# Patient Record
Sex: FEMALE | NOT HISPANIC OR LATINO | URBAN - METROPOLITAN AREA
[De-identification: names, ages, dates, MRNs, and addresses within clinical notes are randomized per-mention and may not be internally consistent; named-entity substitution may affect disease eponyms.]

---

## 2018-08-07 ENCOUNTER — OFFICE VISIT CONVERTED (OUTPATIENT)
Dept: PULMONOLOGY | Facility: CLINIC | Age: 53
End: 2018-08-07
Attending: INTERNAL MEDICINE

## 2018-08-21 ENCOUNTER — OFFICE VISIT CONVERTED (OUTPATIENT)
Dept: PULMONOLOGY | Facility: CLINIC | Age: 53
End: 2018-08-21
Attending: INTERNAL MEDICINE

## 2019-06-20 ENCOUNTER — OFFICE VISIT CONVERTED (OUTPATIENT)
Dept: PULMONOLOGY | Facility: CLINIC | Age: 54
End: 2019-06-20
Attending: PHYSICIAN ASSISTANT

## 2019-06-20 ENCOUNTER — HOSPITAL ENCOUNTER (OUTPATIENT)
Dept: GENERAL RADIOLOGY | Facility: HOSPITAL | Age: 54
Discharge: HOME | End: 2019-06-20
Attending: PHYSICIAN ASSISTANT

## 2019-07-31 ENCOUNTER — OFFICE VISIT CONVERTED (OUTPATIENT)
Dept: PULMONOLOGY | Facility: CLINIC | Age: 54
End: 2019-07-31
Attending: INTERNAL MEDICINE

## 2021-05-28 VITALS
HEIGHT: 68 IN | WEIGHT: 176.5 LBS | RESPIRATION RATE: 12 BRPM | TEMPERATURE: 97.9 F | HEART RATE: 71 BPM | DIASTOLIC BLOOD PRESSURE: 80 MMHG | SYSTOLIC BLOOD PRESSURE: 138 MMHG | BODY MASS INDEX: 26.75 KG/M2 | OXYGEN SATURATION: 97 %

## 2021-05-28 VITALS
WEIGHT: 171.06 LBS | DIASTOLIC BLOOD PRESSURE: 91 MMHG | BODY MASS INDEX: 26.41 KG/M2 | BODY MASS INDEX: 25.92 KG/M2 | SYSTOLIC BLOOD PRESSURE: 149 MMHG | HEIGHT: 68 IN | WEIGHT: 174.25 LBS | RESPIRATION RATE: 10 BRPM | OXYGEN SATURATION: 97 % | OXYGEN SATURATION: 99 % | HEART RATE: 66 BPM | TEMPERATURE: 98.4 F | TEMPERATURE: 98.1 F | HEART RATE: 64 BPM | SYSTOLIC BLOOD PRESSURE: 159 MMHG | DIASTOLIC BLOOD PRESSURE: 84 MMHG | HEIGHT: 68 IN | RESPIRATION RATE: 12 BRPM

## 2021-05-28 VITALS
HEART RATE: 68 BPM | SYSTOLIC BLOOD PRESSURE: 154 MMHG | HEIGHT: 68 IN | DIASTOLIC BLOOD PRESSURE: 96 MMHG | RESPIRATION RATE: 14 BRPM | WEIGHT: 171.19 LBS | TEMPERATURE: 98.2 F | OXYGEN SATURATION: 100 % | BODY MASS INDEX: 25.94 KG/M2

## 2021-05-28 NOTE — PROGRESS NOTES
Patient: AVE HERNÁNDEZ     Acct: [id number]     Report: #[id number]  UNIT #: [id number]     : 1965    Encounter Date:2019  PRIMARY CARE: FORREST CACERES  ***Signed***  --------------------------------------------------------------------------------------------------------------------  Chief Complaint      Encounter Date      2019            Primary Care Provider      FORREST CACERES            Referring Provider      FORREST CACERES            Patient Complaint      Patient is complaining of      Patient here today for follow up, Solitary pulmonary nodule on lung CT            VITALS      Height 68 in / 172.72 cm      Weight 171 lbs 3 oz / 77.567289 kg      BSA 1.91 m2      BMI 26.0 kg/m2      Temperature 98.2 F / 36.78 C - Oral      Pulse 68      Respirations 14      Blood Pressure 154/96 Sitting, Right Arm      Pulse Oximetry 100%, room air            HPI      The patient is a very pleasant 53 year old white female who previously saw Dr. Cornelius and Dr. Lua in lung clinic. She was last seen by Dr. Cornelius about 1     year ago. She was referred to us for pulmonary nodules that were incidentally     noted on CT scan of the chest. She had a PET scan done and there was no      hypermetabolic activity of the largest nodule so follow up CT scan of the chest     was  ordered for 3 months later. The PET scan was done 2018 and CT scan     was ordered for 2018. Unfortunately the patient never had the testing     done, did not provide any explanation for that today. She was completely lost to    follow up until today. The patient denies any increased dyspnea, coughing or     wheezing, denies hemoptysis, fever or chills, unintended weight loss, enlarged     lymph nodes or night sweats.  She admits that she still smokes cigarettes     smoking 1-1.5 packs per day and says she is not interested in quitting right     now.             I reviewed her Review of Systems, medical, surgical  and family history and agree    with those as entered.      Copies To:   Soy Cornelius      Constitutional:  Denies: Fatigue, Fever, Weight gain, Weight loss, Chills,     Insomnia, Other      Respiratory/Breathing:  Denies: Shortness of air, Wheezing, Cough, Hemoptysis,     Pleuritic pain, Other      Endocrine:  Denies: Polydipsia, Polyuria, Heat/cold intolerance, Abnorml     menstrual pattern, Diabetes, Other      Eyes:  Denies: Blurred vision, Vision Changes, Other      Ears, nose, mouth, throat:  Denies: Mouth lesions, Thrush, Throat pain,     Hoarseness, Allergies/Hay Fever, Post Nasal Drip, Headaches, Recent Head Injury,    Nose Bleeding, Neck Stiffness, Thyroid Mass, Hearing Loss, Ear Fullness, Dry     Mouth, Nasal or Sinus Pain, Dry Lips, Nasal discharge, Nasal congestion, Other      Cardiovascular:  Denies: Palpitations, Syncope, Claudication, Chest Pain, Wake     up Gasping for air, Leg Swelling, Irregular Heart Rate, Cyanosis, Dyspnea on     Exertion, Other      Gastrointestinal:  Denies: Nausea, Constipation, Diarrhea, Abdominal pain,     Vomiting, Difficulty Swallowing, Reflux/Heartburn, Dysphagia, Jaundice,     Bloating, Melena, Bloody stools, Other      Genitourinary:  Denies: Urinary frequency, Incontinence, Hematuria, Urgency,     Nocturia, Dysuria, Testicular problems, Other      Musculoskeletal:  Denies: Joint Pain, Joint Stiffness, Joint Swelling, Myalgias,    Other      Hematologic/lymphatic:  DENIES: Lymphadenopathy, Bruising, Bleeding tendencies,     Other      Neurological:  Denies: Headache, Numbness, Weakness, Seizures, Other      Psychiatric:  Denies: Anxiety, Appropriate Effect, Depression, Other      Sleep:  No: Excessive daytime sleep, Morning Headache?, Snoring, Insomnia?, Stop    breathing at sleep?, Other      Integumentary:  Denies: Rash, Dry skin, Skin Warm to Touch, Other      Immunologic/Allergic:  Denies: Latex allergy, Seasonal allergies, Asthma,     Urticaria,  Eczema, Other      Immunization status:  No: Up to date            FAMILY/SOCIAL/MEDICAL HX      Surgical History:  Yes: Breast Surgery, Orthopedic Surgery (right wrist, left     leg), Other Surgeries (ablasion)      Stroke - Family Hx:  Mother, Grandparent      Heart - Family Hx:  Grandparent      Diabetes - Family Hx:  Father      Cancer/Type - Family Hx:  Brother      Is Father Still Living?:  Yes      Is Mother Still Living?:  No      Smoking status:  Current every day smoker (1 ppd x15 years, .5 ppd x 3 years)      Anticoagulation Therapy:  No      Antibiotic Prophylaxis:  No      Medical History:  Yes: Depression, Anxiety, High Blood Pressure, High     Cholesterol, Thyroid Problem; No: Sinus Trouble      Psychiatric History      depression and anxiety            PREVENTION      Hx Influenza Vaccination:  Yes      Date Influenza Vaccine Given:  Mar 1, 2018      Influenza Vaccine Declined:  No      2 or More Falls Past Year?:  No      Fall Past Year with Injury?:  No      Hx Pneumococcal Vaccination:  No      Encouraged to follow-up with:  PCP regarding preventative exams.      Chart initiated by      Dede Griffin CMA            ALLERGIES/MEDICATIONS      Allergies:        Coded Allergies:             CLINDAMYCIN (Verified  Allergy, Intermediate, Rash, 6/20/19)      Medications    Last Reconciled on 6/20/19 10:20 by MEGAN SMYTH      Rizatriptan Benzoate (Maxalt) 10 Mg Tab      10 MG PO ONCE, TAB         Reported         8/7/18       FLUoxetine HCl (PROzac) 40 Mg Capsule      40 MG PO QDAY, CAP         Reported         8/7/18       ALPRAZolam (Xanax) 0.5 Mg Tab      0.5 MG PO Q12HR, TAB 0 Refills         Reported         8/7/18       Simvastatin (Simvastatin*) 10 Mg Tablet      10 MG PO HS, #30 TAB 0 Refills         Reported         8/7/18       Levothyroxine (Levothyroxine) 0.075 Mg Tablet      0.075 MG PO QDAY, #30 TAB 0 Refills         Reported         8/7/18       Valsartan (Diovan) 80 Mg Tablet       80 MG PO QDAY, TAB         Reported         8/7/18       Mirabegron (MYRBETRIQ) 25 Mg Tab.er.24h      50 MG PO QDAY, #60 TAB.ER 0 Refills         Reported         8/7/18      Current Medications      Current Medications Reviewed 6/20/19            EXAM      CONSTITUTIONAL: Pleasant female normal conversant.       EYES : Pink conjunctive, no ptosis, PERRL.       ENMT : Nose and ears appear normal, normal dentition, mild posterior pharyngeal     wall erythema, no sinus tenderness. Mallampati classification       Neck: Nontender, no masses, no thyromegaly, no nodules.      Resp : Grossly clear to auscultation, no wheezes, rhonchi or crackles     appreciated, normal work of breathing noted.        CVS  : No carotid bruits, s1s2 nl, RRR, no murmur, rubs or gallop, no peripheral    edema       Chest wall: Normal rise with inspiration, nontender on palpation.      GI   : Abdomen soft, with no masses, no hepatosplenomegaly, no hernias, BS+      MSK  : Normal gait and station, no digital cyanosis or clubbing       Skin : No rashes, ulcerations or lesions, normal turgor and temperature      Neuro: CN II - XII intact, no sensory deficits, DTRs intact and symmetrical, no     motor weakness      Psych: Appropriate affect, A   Vtials      Vitals:             Height 68 in / 172.72 cm           Weight 171 lbs 3 oz / 77.727317 kg           BSA 1.91 m2           BMI 26.0 kg/m2           Temperature 98.2 F / 36.78 C - Oral           Pulse 68           Respirations 14           Blood Pressure 154/96 Sitting, Right Arm           Pulse Oximetry 100%, room air            REVIEW      Results Reviewed      PCCS Results Reviewed?:  Yes Prev Lab Results, Yes Prev Radiology Results, Yes     Previous [facility] Records      Radiographic Results               [facility]                PACS RADIOLOGY REPORT            Patient: AVE HERNÁNDEZ   Acct: #[id number]   Report: #[id number]             UNIT #: [id number]    DOS: 18 1427      INSURANCE:[employer] [facility]   ORDER #:[id number]      LOCATION:PET     : 1965            PROVIDERS      ADMITTING:     ATTENDING: BETHANY LAGOS      FAMILY:  FORREST CACERES   ORDERING:  BETHANY LAGOS         OTHER: BAUDILIO MCKENNA   DICTATING:  Tristan Jerez MD            REQ #:[id number]   EXAM:[id number] - SKULL BASE TO MIDTHIGH INITIAL      REASON FOR EXAM:  R91.1      REASON FOR VISIT:  R91.1            *******Signed******         PROCEDURE:   PET CT SKULL BASE TO MID THIGH INITIAL             COMPARISON:   Other, CT, ABDOMEN PELVIS W/WO CONTRAST, 2018, 13:42.             INDICATIONS:   R91.1             TECHNIQUE:   After obtaining the patient's consent, F-18 FDG was administered     intravenously.  A PET       scan with concurrent CT imaging was performed from the skull to the thigh with     multiplanar imaging.             RADIONUCLIDE:     10.91 MCI   F18 FDG- I.V.      LABS:                          Blood Glucose 93 mg/dl             FINDINGS:         HEAD/NECK:   Normal.  No pathologic FDG activity.        THORAX:   There is no abnormal metabolic activity particularly within the     pulmonary nodule in the       right lower lobe which measures 1.7 cm.  There is some atelectasis in the right     middle lobe and       lingula.      ABDOMEN:   Normal.  No pathologic FDG activity.        PELVIS:   There is metabolic activity in the right adnexal area with a maximum     SUV of 3.3.  It looks       like there may be an involuting cyst within this area on prior CT from .     This may be       physiologic.  It is presumed that the patient  is premenopausal.      BONES:   Normal.  No pathologic FDG activity.        OTHER:   Negative.               CONCLUSION:         1. Metabolic activity in the right adnexal area that may be physiologic.      Ultrasound would be       suggested to reassess.      2. No abnormal metabolic activity seen  within the pulmonary nodule in the right     lower lobe on this       exam.  Periodic followup to document stability suggested.              SIMBA RITCHIE MD             Electronically Signed and Approved By: SIMBA RITCHIE MD on 8/13/2018 at 16:04                        Until signed, this is an unconfirmed preliminary report that may contain      errors and is subject to change.                                              [facility]      D:08/13/18 1604            Assessment      Pulmonary nodule - R91.1            Notes      New Diagnostics      * Chest W/O Cont CT, As Soon As Possible         Dx: Pulmonary nodule - R91.1      ASSESSMENT:       1. Pulmonary nodules, previously not  hypermetabolic on PET scan.       2. Ongoing tobacco abuse of cigarettes.       3. Medical noncompliance.             PLAN:       1. I have discussed with the patient the importance of doing follow up CT scan     of the chest given her pulmonary nodule and ongoing tobacco abuse. She was due     for follow up CT scan of the chest over 6 months ago so I will reorder that now     and recommend that she have this done as soon as possible.       2. I have discussed smoking cessation for 3 minutes today. She declines nicotine    replacement therapy or  pharmacotherapy at this time.       3. Follow up in 1-2 months with Dr. Cornelius or Dr. Lua to discuss CT scan of     the chest results, sooner if needed.            Patient Education      Patient Education Provided:  Smoking Cessation      Time Spent:  > 50% /[facility]                 Disclaimer: Converted document may not contain table formatting or lab diagrams. Please see [employer] for the authenticated document.

## 2021-05-28 NOTE — PROGRESS NOTES
Patient: AVE HERNÁNDEZ     Acct: [id number]     Report: #[id number]  UNIT #: [id number]     : 1965    Encounter Date:2018  PRIMARY CARE: FORREST CACERES  ***Signed***  --------------------------------------------------------------------------------------------------------------------  Chief Complaint      Encounter Date      Aug 7, 2018            Primary Care Provider      FORREST CACERES            Referring Provider      FORREST CACERES            Patient Complaint      Patient is complaining of      New pt here for lung nodule            VITALS      Height 5 ft 8 in / 172.72 cm      Weight 174 lbs 4 oz / 79.702672 kg      BSA 1.96 m2      BMI 26.5 kg/m2      Temperature 98.1 F / 36.72 C - Temporal      Pulse 66      Respirations 10      Blood Pressure 159/84 Sitting, Left Arm      Pulse Oximetry 97%, Room air            HPI      The patient is a very pleasant 53 year old  male cigarette smoker here     for abnormal chest CT.  The patient was having abdominal pain, was seeing     urology and had a CT of the abdomen and pelvis.  There was multiple right lower     lobe lung nodules with a 1.3 cm noncalcified right lower lobe nodule     identified. The patient had a chest CT done showing multiple lung nodules     throughout the right lung, the largest of which was a noncalcified 1.2 right     upper lobe nodule. She can walk about 2000 feet without stopping and can also     go up 2-3 flights of steps before she has to stop because of shortness of     breath.   She does have occasional cough that is dry with no sputum production     or hemoptysis. She has never been told she has COPD and never had PFTs. She     denies any wheezing, chest pain or hemoptysis. She denies any nausea, vomiting,     fevers, chills, headaches or chest pain.  She does smoke and has smoked about     one half pack of cigarettes a day for 20 years of her life.  She is able to     perform her ADLs without difficulty.   She denies any swollen glands or lymph     nodes of her head and neck.            I have personally reviewed the review of systems, past family, social, surgical     and medical histories and I agree with the findings.            ROS      Constitutional:  Denies: Fatigue, Fever, Weight gain, Weight loss, Chills,     Insomnia, Other      Respiratory/Breathing:  Complains of: Shortness of air, Cough, Denies: Wheezing    , Hemoptysis, Pleuritic pain, Other      Endocrine:  Denies: Polydipsia, Polyuria, Heat/cold intolerance, Abnorml     menstrual pattern, Diabetes, Other      Eyes:  Denies: Blurred vision, Vision Changes, Other      Ears, nose, mouth, throat:  Denies: Mouth lesions, Thrush, Throat pain,     Hoarseness, Allergies/Hay Fever, Post Nasal Drip, Headaches, Recent Head Injury    , Nose Bleeding, Neck Stiffness, Thyroid Mass, Hearing Loss, Ear Fullness, Dry     Mouth, Nasal or Sinus Pain, Dry Lips, Nasal discharge, Nasal congestion, Other      Cardiovascular:  Denies: Palpitations, Syncope, Claudication, Chest Pain, Wake     up Gasping for air, Leg Swelling, Irregular Heart Rate, Cyanosis, Dyspnea on     Exertion, Other      Gastrointestinal:  Denies: Nausea, Constipation, Diarrhea, Abdominal pain,     Vomiting, Difficulty Swallowing, Reflux/Heartburn, Dysphagia, Jaundice, Bloating    , Melena, Bloody stools, Other      Genitourinary:  Denies: Urinary frequency, Incontinence, Hematuria, Urgency,     Nocturia, Dysuria, Testicular problems, Other      Musculoskeletal:  Denies: Joint Pain, Joint Stiffness, Joint Swelling, Myalgias    , Other      Hematologic/lymphatic:  DENIES: Lymphadenopathy, Bruising, Bleeding tendencies,     Other      Neurological:  Denies: Headache, Numbness, Weakness, Seizures, Other      Psychiatric:  Denies: Anxiety, Appropriate Effect, Depression, Other      Sleep:  No: Excessive daytime sleep, Morning Headache?, Snoring, Insomnia?,     Stop breathing at sleep?, Other       Integumentary:  Denies: Rash, Dry skin, Skin Warm to Touch, Other      Immunologic/Allergic:  Denies: Latex allergy, Seasonal allergies, Asthma,     Urticaria, Eczema, Other      Immunization status:  No: Up to date            FAMILY/SOCIAL/MEDICAL HX      Surgical History:  Yes: Breast Surgery, Orthopedic Surgery (right wrist, left     leg), Other Surgeries (ablasion)      Stroke - Family Hx:  Mother, Grandparent      Heart - Family Hx:  Grandparent      Diabetes - Family Hx:  Father      Cancer/Type - Family Hx:  Brother      Is Father Still Living?:  Yes      Is Mother Still Living?:  No      Smoking status:  Current every day smoker (1 ppd x15 years, .5 ppd x 3 years)      Anticoagulation Therapy:  No      Antibiotic Prophylaxis:  No      Medical History:  Yes: Depression, Anxiety, High Blood Pressure, High     Cholesterol, Thyroid Problem      Psychiatric History      Depression, anxiety            PREVENTION      Hx Influenza Vaccination:  Yes      Date Influenza Vaccine Given:  Mar 1, 2018      Influenza Vaccine Declined:  No      2 or More Falls Past Year?:  No      Fall Past Year with Injury?:  No      Hx Pneumococcal Vaccination:  No      Encouraged to follow-up with:  PCP regarding preventative exams.      Chart initiated by      Elinor Jesus MA            ALLERGIES/MEDICATIONS      Allergies:        Coded Allergies:             Clindamycin (Verified  Allergy, Intermediate, Rash, 8/6/18)      Medications    Last Reconciled on 8/7/18 14:59 by BETHANY LAGOS MD      Rizatriptan Benzoate (Maxalt) 10 Mg Tab      10 MG PO ONCE, TAB         Reported         8/7/18       FLUoxetine HCl (PROzac) 40 Mg Capsule      40 MG PO QDAY, CAP         Reported         8/7/18       ALPRAZolam (Xanax) 0.5 Mg Tab      0.5 MG PO Q12HR, TAB 0 Refills         Reported         8/7/18       Simvastatin (Simvastatin*) 10 Mg Tablet      10 MG PO HS, #30 TAB 0 Refills         Reported         8/7/18       Levothyroxine  (Levothyroxine) 0.075 Mg Tablet      0.075 MG PO QDAY, #30 TAB 0 Refills         Reported         8/7/18       Valsartan (Diovan) 80 Mg Tablet      80 MG PO QDAY, TAB         Reported         8/7/18       Mirabegron (MYRBETRIQ) 25 Mg Tab.er.24h      50 MG PO QDAY, #60 TAB.ER 0 Refills         Reported         8/7/18      Current Medications      Current Medications Reviewed 8/6/18            EXAM      Vital Signs Reviewed.      General:  WDWN, Alert, NAD.      HEENT: PERRL, EOMI.  OP, nares clear, no sinus tenderness.      Neck: Supple, no JVD, no thyromegaly.      Lymph: No axillary, cervical, supraclavicular lymphadenopathy noted bilaterally.      Chest: Good aeration, clear to auscultation bilaterally, tympanic to percussion     bilaterally, no work of breathing noted.      CV: RRR, no MGR, pulses 2+, equal.        Abd: Soft, NT, ND, +BS, no HSM.      EXT: No clubbing, no cyanosis, no edema, no joint tenderness.        Neuro:  A  Skin: No rashes or lesions.      Vtials      Vitals:             Height 5 ft 8 in / 172.72 cm           Weight 174 lbs 4 oz / 79.870628 kg           BSA 1.96 m2           BMI 26.5 kg/m2           Temperature 98.1 F / 36.72 C - Temporal           Pulse 66           Respirations 10           Blood Pressure 159/84 Sitting, Left Arm           Pulse Oximetry 97%, Room air            REVIEW      Results Reviewed      PCCS Results Reviewed?:  Yes Prev Lab Results, Yes Prev Radiology Results, Yes     [facility]      Lab Results      I reviewed office notes from referring provider.  I reviewed CT of the abdomen     and pelvis from 07/2018.  I also personally reviewed CT of chest in 07/2018     showing multiple pulmonary nodules in the right lower lobe varying in size from     3-5 mm and there is a separate 1.2  x 1.3 cm noncalcified right upper lobe     pulmonary nodule.            Assessment      SOTO (dyspnea on exertion) - R06.09            Cough - R05            Tobacco abuse -  Z72.0            Solitary pulmonary nodule on lung CT - R91.1            Notes      New Medications      * MIRABEGRON (MYRBETRIQ) 25 MG TAB.ER.24H: 50 MG PO QDAY #60      * Valsartan (Diovan) 80 MG TABLET: 80 MG PO QDAY      * Levothyroxine 0.075 MG TABLET: 0.075 MG PO QDAY #30      * Simvastatin (Simvastatin*) 10 MG TABLET: 10 MG PO HS #30      * ALPRAZolam (Xanax) 0.5 MG TAB: 0.5 MG PO Q12HR      * FLUoxetine HCl (PROzac) 40 MG CAPSULE: 40 MG PO QDAY      * Rizatriptan Benzoate (Maxalt) 10 MG TAB: 10 MG PO ONCE       Instructions:  1 TAB      New Diagnostics      * 6 Min Walk With Pulse Ox, Routine       Dx: SOTO (dyspnea on exertion) - R06.09      * PFT-Comp, PrePost,DLCO,BodyBox, As Soon As Possible       Dx: SOTO (dyspnea on exertion) - R06.09      * PET Skull Base Midthigh Init, As Soon As Possible       Dx: SOTO (dyspnea on exertion) - R06.09      IMPRESSION:      1.  Dyspnea on exertion.      2.  Cough.      3.  Multiple lung nodules, the largest of which is 1.2 x 1.3 cm noncalcified     right upper lobe in a high risk patient.      4. Tobacco abuse with cigarettes.              PLAN:      1.  Reviewed the patient's chest CT.  Given multiple lung nodules there is     features of granulomatous lung disease.  My main concern is this 1.2 x 1.3 cm     nodule in this patient with a smoking history which makes it high risk.        2.  We will check PET scan asap as well as pulmonary function testing asap.        3.  We will have patient follow up in 2-3 weeks to go through PET CT and PFTs.      If there is no PET avidity in the nodule, we can likely just do serial     monitoring with serial CAT scans with a short interval 3 month follow up for     the first one. If the largest nodule is indeed PET positive, I think this     potentially should be resected. I will send PFTs and six minute walk test to     get a better idea of her pulmonary capacity as well as her exercise capacity     and tolerance.        4.  Smoking  cessation counseling provided.  I spent 4 minutes today counseling     the patient on risks of smoking, including throat cancer, lung cancer, COPD,     heart disease and death. I also discussed the benefits of quitting.   1-800-    QUITNOW number was provided. She refused nicotine replacement and     pharmacotherapy.        5. I will have patient follow up in 2-3 weeks with test results.            Patient Education      Tobacco Cessation Counseling:  for 3 - 10 minutes      Other Education:  CT results                 Disclaimer: Converted document may not contain table formatting or lab diagrams. Please see [employer] [facility] for the authenticated document.

## 2021-05-28 NOTE — PROGRESS NOTES
Patient: AVE HERNÁNDEZ     Acct: [id number]     Report: #[id number]  UNIT #: [id number]     : 1965    Encounter Date:2019  PRIMARY CARE: FORREST CACERES  ***Signed***  --------------------------------------------------------------------------------------------------------------------  Chief Complaint      Encounter Date      2019            Primary Care Provider      FORREST CACERES            Referring Provider      FORREST CACERES            Patient Complaint      Patient is complaining of      f/u pulmonary nodule            VITALS      Height 5 ft 8 in / 172.72 cm      Weight 171 lbs 1 oz / 77.58607 kg      BSA 1.91 m2      BMI 26.0 kg/m2      Temperature 98.4 F / 36.89 C - Oral      Pulse 64      Respirations 12      Blood Pressure 149/91 Sitting, Right Arm      Pulse Oximetry 99%, roomair            HPI      The patient is a very pleasant 54 year old  female cigarettes smoker     here for follow up for a solitary lung nodule.             Compared to a year ago she had her follow up CT scan of the chest done showing     no change in size of multiple lung nodules, the largest of which is in the right    lung at 1.3 cm. She has been doing well since the last time she was here. She     denies any dyspnea, cough, wheezing, chest pain or hemoptysis. She denies any     nausea or vomiting, fever or chills or headaches. She denies any unintended     weight loss or enlarged lymph nodes in her head or neck. She is able to perform     her ADL's without difficulty and denies any swollen glands in her lymph nodes,     head or neck. She is still smoking but she has cut down to 3/4 pack per day.             I personally reviewed Review of Systems, family, social, surgical and medical     history and agree with their findings.            ROS      Constitutional:  Denies: Fatigue, Fever, Weight gain, Weight loss, Chills,     Insomnia, Other      Respiratory/Breathing:  Denies: Shortness of air,  Wheezing, Cough, Hemoptysis,     Pleuritic pain, Other      Endocrine:  Denies: Polydipsia, Polyuria, Heat/cold intolerance, Abnorml     menstrual pattern, Diabetes, Other      Eyes:  Denies: Blurred vision, Vision Changes, Other      Ears, nose, mouth, throat:  Denies: Mouth lesions, Thrush, Throat pain, Hoarsen    ess, Allergies/Hay Fever, Post Nasal Drip, Headaches, Recent Head Injury, Nose     Bleeding, Neck Stiffness, Thyroid Mass, Hearing Loss, Ear Fullness, Dry Mouth,     Nasal or Sinus Pain, Dry Lips, Nasal discharge, Nasal congestion, Other      Cardiovascular:  Denies: Palpitations, Syncope, Claudication, Chest Pain, Wake     up Gasping for air, Leg Swelling, Irregular Heart Rate, Cyanosis, Dyspnea on     Exertion, Other      Gastrointestinal:  Denies: Nausea, Constipation, Diarrhea, Abdominal pain,     Vomiting, Difficulty Swallowing, Reflux/Heartburn, Dysphagia, Jaundice,     Bloating, Melena, Bloody stools, Other      Genitourinary:  Denies: Urinary frequency, Incontinence, Hematuria, Urgency,     Nocturia, Dysuria, Testicular problems, Other      Musculoskeletal:  Denies: Joint Pain, Joint Stiffness, Joint Swelling, Myalgias,    Other      Hematologic/lymphatic:  DENIES: Lymphadenopathy, Bruising, Bleeding tendencies,     Other      Neurological:  Denies: Headache, Numbness, Weakness, Seizures, Other      Psychiatric:  Denies: Anxiety, Appropriate Effect, Depression, Other      Sleep:  No: Excessive daytime sleep, Morning Headache?, Snoring, Insomnia?, Stop    breathing at sleep?, Other      Integumentary:  Denies: Rash, Dry skin, Skin Warm to Touch, Other      Immunologic/Allergic:  Denies: Latex allergy, Seasonal allergies, Asthma,     Urticaria, Eczema, Other      Immunization status:  No: Up to date            FAMILY/SOCIAL/MEDICAL HX      Surgical History:  Yes: Breast Surgery, Orthopedic Surgery (right wrist, left     leg), Other Surgeries (ablasion)      Stroke - Family Hx:  Mother, Grandparent       Heart - Family Hx:  Grandparent      Diabetes - Family Hx:  Father      Cancer/Type - Family Hx:  Brother      Is Father Still Living?:  Yes      Is Mother Still Living?:  No       Family History:  Yes      Social History:  No Tobacco Use, No Alcohol Use, No Recreational Drug use      Smoking status:  Current every day smoker (1 ppd x15 years, .5 ppd x 3 years)      Anticoagulation Therapy:  No      Antibiotic Prophylaxis:  No      Medical History:  Yes: Depression, Anxiety, High Blood Pressure, High     Cholesterol, Thyroid Problem; No: Sinus Trouble      Psychiatric History      depression/ anxiety            PREVENTION      Hx Influenza Vaccination:  Yes      Date Influenza Vaccine Given:  Mar 1, 2018      Influenza Vaccine Declined:  No      2 or More Falls Past Year?:  No      Fall Past Year with Injury?:  No      Hx Pneumococcal Vaccination:  No      Encouraged to follow-up with:  PCP regarding preventative exams.      Chart initiated by      crescencio hernandez            ALLERGIES/MEDICATIONS      Allergies:        Coded Allergies:             CLINDAMYCIN (Verified  Allergy, Intermediate, Rash, 7/31/19)      Medications    Last Reconciled on 7/31/19 11:24 by BETHANY LAGOS MD      Liothyronine Sodium (Cytomel) 5 Mcg Tablet      10 MCG PO QDAY for 30 Days, #60 TAB         Reported         7/31/19       Rizatriptan Benzoate (Maxalt) 10 Mg Tab      10 MG PO ONCE, TAB         Reported         8/7/18       FLUoxetine HCl (PROzac) 40 Mg Capsule      40 MG PO QDAY, CAP         Reported         8/7/18       ALPRAZolam (Xanax) 0.5 Mg Tab      0.5 MG PO Q12HR, TAB 0 Refills         Reported         8/7/18       Simvastatin (Simvastatin*) 10 Mg Tablet      10 MG PO HS, #30 TAB 0 Refills         Reported         8/7/18       Levothyroxine (Levothyroxine) 0.075 Mg Tablet      0.075 MG PO QDAY, #30 TAB 0 Refills         Reported         8/7/18       Valsartan (Diovan) 80 Mg Tablet      80 MG PO QDAY, TAB          Reported         18       Mirabegron (MYRBETRIQ) 25 Mg Tab.er.24h      50 MG PO QDAY, #60 TAB.ER 0 Refills         Reported         18      Current Medications      Current Medications Reviewed 19            EXAM      Vital Signs Reviewed      Gen: WDWN, Alert, NAD.        HEENT:  PERRL, EOMI.  OP, nares clear, no sinus tenderness.      Chest:  Good aeration, clear to auscultation bilaterally, tympanic to percussion    bilaterally, no work of breathing noted.      CV:  RRR, no MGR, pulses 2+, equal.      Abd:  Soft, NT, ND, + BS, no HSM.      EXT:  No clubbing, no cyanosis, no edema.       Neuro:  A  Skin: No rashes or lesions.      Vtials      Vitals:             Height 5 ft 8 in / 172.72 cm           Weight 171 lbs 1 oz / 77.46845 kg           BSA 1.91 m2           BMI 26.0 kg/m2           Temperature 98.4 F / 36.89 C - Oral           Pulse 64           Respirations 12           Blood Pressure 149/91 Sitting, Right Arm           Pulse Oximetry 99%, roomair            REVIEW      Results Reviewed      PCCS Results Reviewed?:  Yes Prev Radiology Results, Yes Previous Mecial Records      Radiographic Results               [facility]                PACS RADIOLOGY REPORT            Patient: AVE HERNÁNDEZ   Acct: #[id number]   Report: #[id number]            UNIT #: [id number]    DOS: 19 1045      INSURANCE:[employer]WORK - [facility]   ORDER #:[id number]      LOCATION:[place]     : 1965            PROVIDERS      ADMITTING:     ATTENDING: Delia Nugent PA-C      FAMILY:  FORREST CACERES   ORDERING:  Delia Nugent PA-C         OTHER:    DICTATING:  Werner Rodriguez MD            REQ #:[id number]   EXAM:[facility] - CT CHEST without CONTRAST      REASON FOR EXAM:        REASON FOR VISIT:  SOL PULM NODULE            *******Signed******         PROCEDURE:   CT CHEST WITHOUT CONTRAST             COMPARISON:   [facility], PET, SKULL  BASE TO MID THIGH INITIAL,     8/13/2018, 14:59.        Other, CT, CHEST W/ CONTRAST, 7/23/2018, 10:27.             INDICATIONS:   FOLLOW UP LUNG NODULE. NO CHEST COMPLAINTS. SMOKER.             TECHNIQUE:   CT images were created without the administration of contrast     material.               PROTOCOL:     Standard imaging protocol performed                RADIATION:     DLP: 297.2mGy*cm          Automated exposure control was utilized to minimize radiation dose.              FINDINGS:         There are postoperative changes of bilateral augmentation mammoplasty.  There     are 2 small nodular       density in the peripheral aspect of the right middle lobe measuring up to 3 mm.     These are better       seen on the scan than they were on the prior CT but have not changed.  There is     a 1.5 cm lobulated       soft tissue mass in the right lower lobe.  The size has not changed     significantly since the last       study.  There are fibrotic changes in the peripheral aspect of the lingula.      There is linear       scarring in the right middle lobe anteromedially.  No new or enlarging pulmonary    nodules or masses       are identified.  There is no significant mediastinal or hilar lymphadenopathy.      Scans through the       upper abdomen do suggest mild diffuse hepatic steatosis.             CONCLUSION:         1. Stable right lower lobe pulmonary nodule measuring 1.5 cm in diameter.  This     has been previously       evaluated by PET-CT and found to be PET negative.  Suggest this mass be followed    by serial CT for 2       years to ensure stability.  Recommend a repeat chest CT in 6-12 months.      2. There are at least 2 tiny nodules in the right middle lobe, 3 mm or less in     size.  These are       present on the previous CT but are better appreciated on the current study.      3. Linear scarring in the right middle lobe and lingula.      4. Postop changes of prior augmentation mammoplasty               Werner Rodriguez MD             Electronically Signed and Approved By: Werner Rodriguez MD on 6/20/2019 at 12:02                           Until signed, this is an unconfirmed preliminary report that may contain      errors and is subject to change.                                              RACHELLE:      D:06/20/19 1203            Assessment      Solitary pulmonary nodule on lung CT - R91.1            Tobacco abuse - Z72.0            Notes      New Medications      * LIOTHYRONINE SODIUM (Cytomel) 5 MCG TABLET: 10 MCG PO QDAY 30 Days #60      New Diagnostics      * Chest W/O Cont CT, Year         Dx: Solitary pulmonary nodule on lung CT - R91.1      IMPRESSION:      1. Multiple lung nodules, the largest of which is 1.3 cm in the right lower     lobe, stable X 12 months.      2. Tobacco abuse of cigarettes.       3. Noncompliance with medical regimen.             PLAN:      1. Repeat noncontrast CT scan of the chest in 1 year per Fleischner criteria.     After this it will have been 24 months of stability and there is no indication     to repeat further CT scan of the chest. Of note, based off her smoking history     she is not a candidate for lung cancer screening as she has not had the length     of cigarettes smoking required to be eligible.       2. Smoking cessation counseling provided.  I spent 5 minutes today counseling     the patient on risks of smoking, including throat cancer, lung cancer, COPD,     heart disease and death. I also discussed the benefits of quitting. I gave her     1-800 QUIT NOW number. She declines nicotine replacement therapy or      pharmacotherapy.       3. She is up to date with her flu vaccine. She will be eligible for pneumovax     and prevnar when she is 63.       4. Follow up with me in 1 year with CT scan of the chest.            Patient Education      Tobacco Cessation Counseling:  for 3 - 10 minutes      Patient Education Provided:  Smoking Cessation                 Disclaimer:  Converted document may not contain table formatting or lab diagrams. Please see [employer] for the authenticated document.

## 2021-05-28 NOTE — PROGRESS NOTES
Patient: AVE HERNÁNDEZ     Acct: [id number]     Report: #[id number]  UNIT #: [id number]     : 1965    Encounter Date:2018  PRIMARY CARE: FORREST CACERES  ***Signed***  --------------------------------------------------------------------------------------------------------------------  Chief Complaint      Encounter Date      Aug 21, 2018            Primary Care Provider      FORREST CACERES            Referring Provider      FORREST CACERES            Patient Complaint      Patient is complaining of      Pt here for 2-3w f/u, pet scan, pft and 6m walk results            VITALS      Height 5 ft 8 in / 172.72 cm      Weight 176 lbs 8 oz / 80.164639 kg      BSA 1.97 m2      BMI 26.8 kg/m2      Temperature 97.9 F / 36.61 C - Temporal      Pulse 71      Respirations 12      Blood Pressure 138/80 Sitting, Left Arm      Pulse Oximetry 97%, Room air            HPI      The patient is a 53 year old female with history of smoking 1-1.5 packs per day     for 20 years. She recently had CT scan of the chest on 18 showing 1.2 X     1.4 X 1.1 cm noncalcified nodule in the right lower lobe. There were 3 micro n    odules in the right middle lobe, 2 micro nodules in the right lower lobe, the     largest was 5 mm and 4 mm in the left upper lobe. Because of the CT scan     findings she had a CT scan of the chest and later had a PET scan. The PET scan     was done on 18. She is here for follow up.             The PET scan did not show any abnormal metabolic activity in the largest     pulmonary nodule. She has no significant shortness of breath, no fevers or     chills, no nausea and vomiting. She is trying to stop smoking. She is currently     smoking 4-6 cigarettes a day and her  is trying to help her quit smoking.    She does not want any medications to help her quit. She has no chest pain, no     nausea and vomiting, no weight loss or loss of appetite and no new headaches.     She has no fevers  or chills or night sweats. We reviewed the PET scan results     today. I also reviewed the patient's pulmonary function tests and six minute     walk test today.            ROS      Constitutional:  Complains of: Fatigue; Denies: Fever, Weight gain, Weight loss,    Chills, Insomnia, Other      Respiratory/Breathing:  Denies: Shortness of air, Wheezing, Cough, Hemoptysis,     Pleuritic pain, Other      Endocrine:  Denies: Polydipsia, Polyuria, Heat/cold intolerance, Abnorml men    strual pattern, Diabetes, Other      Eyes:  Denies: Blurred vision, Vision Changes, Other      Ears, nose, mouth, throat:  Denies: Mouth lesions, Thrush, Throat pain,     Hoarseness, Allergies/Hay Fever, Post Nasal Drip, Headaches, Recent Head Injury,    Nose Bleeding, Neck Stiffness, Thyroid Mass, Hearing Loss, Ear Fullness, Dry     Mouth, Nasal or Sinus Pain, Dry Lips, Nasal discharge, Nasal congestion, Other      Cardiovascular:  Denies: Palpitations, Syncope, Claudication, Chest Pain, Wake     up Gasping for air, Leg Swelling, Irregular Heart Rate, Cyanosis, Dyspnea on     Exertion, Other      Gastrointestinal:  Denies: Nausea, Constipation, Diarrhea, Abdominal pain,     Vomiting, Difficulty Swallowing, Reflux/Heartburn, Dysphagia, Jaundice,     Bloating, Melena, Bloody stools, Other      Genitourinary:  Denies: Urinary frequency, Incontinence, Hematuria, Urgency,     Nocturia, Dysuria, Testicular problems, Other      Musculoskeletal:  Denies: Joint Pain, Joint Stiffness, Joint Swelling, Myalgias,    Other      Hematologic/lymphatic:  DENIES: Lymphadenopathy, Bruising, Bleeding tendencies,     Other      Neurological:  Denies: Headache, Numbness, Weakness, Seizures, Other      Psychiatric:  Denies: Anxiety, Appropriate Effect, Depression, Other      Sleep:  No: Excessive daytime sleep, Morning Headache?, Snoring, Insomnia?, Stop    breathing at sleep?, Other      Integumentary:  Denies: Rash, Dry skin, Skin Warm to Touch, Other       Immunologic/Allergic:  Denies: Latex allergy, Seasonal allergies, Asthma,     Urticaria, Eczema, Other      Immunization status:  No: Up to date            FAMILY/SOCIAL/MEDICAL HX      Surgical History:  Yes: Breast Surgery, Orthopedic Surgery (right wrist, left     leg), Other Surgeries (ablasion)      Stroke - Family Hx:  Mother, Grandparent      Heart - Family Hx:  Grandparent      Diabetes - Family Hx:  Father      Cancer/Type - Family Hx:  Brother      Is Father Still Living?:  Yes      Is Mother Still Living?:  No      Smoking status:  Current every day smoker (1 ppd x15 years, .5 ppd x 3 years)      Anticoagulation Therapy:  No      Antibiotic Prophylaxis:  No      Medical History:  Yes: Depression, Anxiety, High Blood Pressure, High Cholestero    l, Thyroid Problem; No: Sinus Trouble      Psychiatric History      Depression and anxiety            PREVENTION      Hx Influenza Vaccination:  Yes      Date Influenza Vaccine Given:  Mar 1, 2018      Influenza Vaccine Declined:  No      2 or More Falls Past Year?:  No      Fall Past Year with Injury?:  No      Hx Pneumococcal Vaccination:  No      Encouraged to follow-up with:  PCP regarding preventative exams.      Chart initiated by      Elinor tenorio MA            ALLERGIES/MEDICATIONS      Allergies:        Coded Allergies:             CLINDAMYCIN (Verified  Allergy, Intermediate, Rash, 8/20/18)      Medications    Last Reconciled on 8/21/18 18:07 by TIO HART MD      Rizatriptan Benzoate (Maxalt) 10 Mg Tab      10 MG PO ONCE, TAB         Reported         8/7/18       FLUoxetine HCl (PROzac) 40 Mg Capsule      40 MG PO QDAY, CAP         Reported         8/7/18       ALPRAZolam (Xanax) 0.5 Mg Tab      0.5 MG PO Q12HR, TAB 0 Refills         Reported         8/7/18       Simvastatin (Simvastatin*) 10 Mg Tablet      10 MG PO HS, #30 TAB 0 Refills         Reported         8/7/18       Levothyroxine (Levothyroxine) 0.075 Mg Tablet      0.075 MG PO QDAY, #30  TAB 0 Refills         Reported         18       Valsartan (Diovan) 80 Mg Tablet      80 MG PO QDAY, TAB         Reported         18       Mirabegron (MYRBETRIQ) 25 Mg Tab.er.24h      50 MG PO QDAY, #60 TAB.ER 0 Refills         Reported         18      Current Medications      Current Medications Reviewed 18            EXAM      Vital Signs Reviewed.      General:  WDWN, Alert, NAD.      HEENT: PERRL, EOMI.  OP, nares clear, no sinus tenderness.      Neck: Supple, no JVD, no thyromegaly.      Lymph: No axillary, cervical, supraclavicular lymphadenopathy noted bilaterally.      Chest: Good aeration, clear to auscultation bilaterally, tympanic to percussion     bilaterally, no work of breathing noted.      CV: RRR, no MGR, pulses 2+, equal.        Abd: Soft, NT, ND, +BS, no HSM.      EXT: No clubbing, no cyanosis, no edema, no joint tenderness.        Neuro:  A  Skin: No rashes or lesions.      Vtials      Vitals:             Height 5 ft 8 in / 172.72 cm           Weight 176 lbs 8 oz / 80.019238 kg           BSA 1.97 m2           BMI 26.8 kg/m2           Temperature 97.9 F / 36.61 C - Temporal           Pulse 71           Respirations 12           Blood Pressure 138/80 Sitting, Left Arm           Pulse Oximetry 97%, Room air            REVIEW      Results Reviewed      PCCS Results Reviewed?:  Yes Prev Lab Results, Yes Prev Radiology Results, Yes     Previous Mecial Records      Radiographic Results               [facility]                PACS RADIOLOGY REPORT            Patient: AVE HERNÁNDEZ   Acct: #[id number]   Report: #[id number]            UNIT #: [id number]    DOS: 18 1427      INSURANCE:[employer] - [employer]   ORDER #:[id number]      LOCATION:PET     : 1965            PROVIDERS      ADMITTING:     ATTENDING: BETHANY LAGOS      FAMILY:  FORREST CACERES   ORDERING:  BETHANY LAGOS         OTHER: BAUDILIO MCKENNA    DICTATING:  Tristan Ritchie MD            REQ #:[id number]   EXAM:[id number] - SKULL BASE TO MIDTHIGH INITIAL      REASON FOR EXAM:  R91.1      REASON FOR VISIT:  R91.1            *******Signed******         PROCEDURE:   PET CT SKULL BASE TO MID THIGH INITIAL             COMPARISON:   Other, CT, ABDOMEN PELVIS W/WO CONTRAST, 7/13/2018, 13:42.             INDICATIONS:   R91.1             TECHNIQUE:   After obtaining the patient's consent, F-18 FDG was administered     intravenously.  A PET       scan with concurrent CT imaging was performed from the skull to the thigh with     multiplanar imaging.             RADIONUCLIDE:     10.91 MCI   F18 FDG- I.V.      LABS:                          Blood Glucose 93 mg/dl             FINDINGS:         HEAD/NECK:   Normal.  No pathologic FDG activity.        THORAX:   There is no abnormal metabolic activity particularly within the     pulmonary nodule in the       right lower lobe which measures 1.7 cm.  There is some atelectasis in the right     middle lobe and       lingula.      ABDOMEN:   Normal.  No pathologic FDG activity.        PELVIS:   There is metabolic activity in the right adnexal area with a maximum     SUV of 3.3.  It looks       like there may be an involuting cyst within this area on prior CT from July 13.     This may be       physiologic.  It is presumed that the patient  is premenopausal.      BONES:   Normal.  No pathologic FDG activity.        OTHER:   Negative.               CONCLUSION:         1. Metabolic activity in the right adnexal area that may be physiologic.      Ultrasound would be       suggested to reassess.      2. No abnormal metabolic activity seen within the pulmonary nodule in the right     lower lobe on this       exam.  Periodic followup to document stability suggested.              TRISTAN RITCHIE MD             Electronically Signed and Approved By: TRISTAN RITCHIE MD on 8/13/2018 at 16:04                        Until signed, this is an  unconfirmed preliminary report that may contain      errors and is subject to change.                                              [facility]:      D:08/13/18 1604            Assessment      Solitary pulmonary nodule on lung CT - R91.1            Notes      New Diagnostics      * Chest W/O Cont CT, 3 Months         Dx: Solitary pulmonary nodule on lung CT - R91.1      PLAN:      The patient is a 53 year old female with exertional dyspnea and cough likely     related to chronic smoking. She has normal pulmonary function tests and multiple    lung nodules, the largest 1.2 X 1.3 cm in size, noncalcified in the right upper     lobe. It is not hypermetabolic.             1. Lung nodules. PET scan did not show any hypermetabolism. She will need repeat    CT scan in 3 months.      2. Pulmonary function tests were normal. I advised her that her cough and     dyspnea is likely related to her chronic smoking. She needs to quit smoking.       3. Smoking cessation. Counseling was done for more than 5 minutes. I offered     nicotine patches and lozenges but she does not want to use anything now, she     wants to work on her own. I advised her to go down 1 cigarette every week. She     should be able to quit smoking by the end of September if she does.        4.  I will follow up with her in 3 months, earlier if needed.            Patient Education      Education resources provided:  Yes      Patient Education Provided:  Acute Bronchitis                 Disclaimer: Converted document may not contain table formatting or lab diagrams. Please see [employer] for the authenticated document.